# Patient Record
(demographics unavailable — no encounter records)

---

## 2025-06-07 NOTE — HISTORY OF PRESENT ILLNESS
[FreeTextEntry1] :   Subjective:   - Summary: Patient presents with painful bilateral third interspace, increasing in severity over several weeks. No history of trauma reported.   - Chief Complaint (CC): Painful bilateral third interspace   - History of Present Illness (HPI): Patient reports bilateral third interspace pain that has been present for several weeks. The pain has been increasing in severity. There is no history of trauma associated with the condition. TYhere are also thickened, elongated, painful, onychomycotic, onychogryphotic, dystrophic toenails times ten, and generalized bilateral foot pain.   Objective:   - Diagnostic Results:    - Vital Signs:    - Physical Examination (PE): BP is 2/4 bilateral. PT is 1/4 bilateral. Capillary refill time is 2 seconds times ten.. Pain upon palpation of the bilateral third interspaces, consistent with Knight's neuroma. Neurovascular status is intact bilaterally. Skin is well hydrated with good turgor. There are thickened, elongated, painful, onychomycotic, and onychogryphotic toenails times ten.   Assessment:   -Left foot pain. -right foot pain -Onychomycosis -Onychogryphosis      - Knight's neuroma, bilateral third interspace         Plan:     - -Exam. -Exam. -Aseptic mechanical debrodement of thickened, elongated, painful, onychomycotic, onychogryphotic, dystrophic toenails times ten.     - Inject bilateral third interspaces with 1.0 cc each of dexamethasone phosphate, cyanocobalamin, and 2% lidocaine plain.     - Schedule follow-up appointment in 3 weeks to evaluate treatment response.     - Rx: Ciclodan-8%-6.6 cc's-apply to toenails as directed daily.

## 2025-06-07 NOTE — PROCEDURE
[FreeTextEntry1] : inject bilateral third interspaces with 1.0 cc each of Dexamethasone Phosphate, 2% Lidocaine, and Cyanocobalamin.

## 2025-07-25 NOTE — PHYSICAL EXAM
[de-identified] : Patient is a mass present on the right index finger area around the proximal phalanx level.  Is on the ulnar aspect of the digit.  There is no associated Tinel's with it.  Normal capillary refill.  There is no triggering

## 2025-07-25 NOTE — HISTORY OF PRESENT ILLNESS
[de-identified] : 65-year-old female mass present on her right index finger.  It has been there for several months.  She comes in today for evaluation.  The mass does bother her with pain and discomfort associated with it.  She did get a workup done which consisted of MRI with and without contrast.

## 2025-07-25 NOTE — ASSESSMENT
[FreeTextEntry1] : Patient has a mass of the right index finger.  She will undergo surgical excision of the same.  Risk and benefits of surgery limited to bleeding infection risk injury stiffness discussed with the patient.  Postoperative courses discussed.  Doing the surgery and the local anesthesia was discussed.  She will see us back the time of surgical intervention.